# Patient Record
(demographics unavailable — no encounter records)

---

## 2024-10-21 NOTE — DATA REVIEWED
[FreeTextEntry1] : The TSH was well suppressed and the Free t4 was normal. The thyroglobulin was also well suppressed with undetectable thyroglobulin and negative antibodies. The Calcium was normal. The FBS was slightly elevated and the HbA1c was unchanged.

## 2024-10-21 NOTE — PHYSICAL EXAM
[Alert] : alert [No Acute Distress] : no acute distress [No Neck Mass] : no neck mass was observed [No Respiratory Distress] : no respiratory distress [Clear to Auscultation] : lungs were clear to auscultation bilaterally [Normal PMI] : the apical impulse was normal [Normal Rate] : heart rate was normal [No Edema] : no peripheral edema [de-identified] : Surgical scar present

## 2024-10-21 NOTE — HISTORY OF PRESENT ILLNESS
[FreeTextEntry1] : The patient had a total thyroidectomy for thyroid cancer. She is doing well. Denies any trouble swallowing, neck pain, heat intolerance, palpitations, hoarseness, fatigue. Her weight is unchanged. She is taking the Levothyroxine regularly and the Calcium supplements.

## 2024-10-21 NOTE — PHYSICAL EXAM
[Alert] : alert [No Acute Distress] : no acute distress [No Neck Mass] : no neck mass was observed [No Respiratory Distress] : no respiratory distress [Clear to Auscultation] : lungs were clear to auscultation bilaterally [Normal PMI] : the apical impulse was normal [Normal Rate] : heart rate was normal [No Edema] : no peripheral edema [de-identified] : Surgical scar present

## 2024-10-21 NOTE — ASSESSMENT
[FreeTextEntry1] : The patient is clinically euthyroid. The thyroid cancer is stable The TSH is well suppressed and the Thyroglobulin is undetectable with negative Antibodies Her calcium was normal Will continue the same treatment Will repeat the US thyroid before next visit Will repeat the blood tests before next visit The Prediabetes is stable Advised to follow the diet and exercise

## 2025-05-21 NOTE — HISTORY OF PRESENT ILLNESS
[Home] : at home, [unfilled] , at the time of the visit. [Other Location: e.g. Home (Enter Location, City,State)___] : at [unfilled] [Telephone (audio)] : This telephonic visit was provided via audio only technology. [Patient preference] : patient preference. [Verbal consent obtained from patient] : the patient, [unfilled] [FreeTextEntry1] : The patient had a total thyroidectomy for thyroid cancer. She is doing well. Denies any trouble swallowing, neck pain, heat intolerance, palpitations, hoarseness, fatigue. Her weight has increased. She is taking the Levothyroxine regularly .

## 2025-05-21 NOTE — END OF VISIT
[Time Spent: ___ minutes] : I have spent [unfilled] minutes of time on the encounter which excludes teaching and separately reported services.
4 = No assist / stand by assistance

## 2025-05-21 NOTE — DATA REVIEWED
[FreeTextEntry1] : The TSH was low and the Free t4 was elevated. The thyroglobulin was well suppressed with undetectable thyroglobulin and negative antibodies. The Calcium was normal. The latest US thyroid was unremarkable. The FBS was slightly elevated and the HbA1c have improved. Her lipid panel was fine.

## 2025-05-21 NOTE — ASSESSMENT
[FreeTextEntry1] : The patient is slightly hyperthyroid The thyroid cancer is stable The TSH is low and the Thyroglobulin is undetectable with negative Antibodies Her calcium was normal Her US thyroid was stable Will reduce the Levothyroxine slightly Will repeat the blood tests before next visit